# Patient Record
Sex: FEMALE | Race: WHITE
[De-identification: names, ages, dates, MRNs, and addresses within clinical notes are randomized per-mention and may not be internally consistent; named-entity substitution may affect disease eponyms.]

---

## 2020-05-22 ENCOUNTER — HOSPITAL ENCOUNTER (EMERGENCY)
Dept: HOSPITAL 8 - ED | Age: 35
Discharge: HOME | End: 2020-05-22
Payer: COMMERCIAL

## 2020-05-22 VITALS — HEIGHT: 64 IN | WEIGHT: 222.23 LBS | BODY MASS INDEX: 37.94 KG/M2

## 2020-05-22 VITALS — SYSTOLIC BLOOD PRESSURE: 120 MMHG | DIASTOLIC BLOOD PRESSURE: 67 MMHG

## 2020-05-22 DIAGNOSIS — K52.9: Primary | ICD-10-CM

## 2020-05-22 DIAGNOSIS — R10.31: ICD-10-CM

## 2020-05-22 DIAGNOSIS — R10.32: ICD-10-CM

## 2020-05-22 DIAGNOSIS — R11.2: ICD-10-CM

## 2020-05-22 LAB
ALBUMIN SERPL-MCNC: 3.9 G/DL (ref 3.4–5)
ALP SERPL-CCNC: 94 U/L (ref 45–117)
ALT SERPL-CCNC: 92 U/L (ref 12–78)
ANION GAP SERPL CALC-SCNC: 8 MMOL/L (ref 5–15)
BASOPHILS # BLD AUTO: 0.04 X10^3/UL (ref 0–0.1)
BASOPHILS NFR BLD AUTO: 0 % (ref 0–1)
BILIRUB SERPL-MCNC: 0.6 MG/DL (ref 0.2–1)
CALCIUM SERPL-MCNC: 9.5 MG/DL (ref 8.5–10.1)
CHLORIDE SERPL-SCNC: 108 MMOL/L (ref 98–107)
CREAT SERPL-MCNC: 0.93 MG/DL (ref 0.55–1.02)
EOSINOPHIL # BLD AUTO: 0.1 X10^3/UL (ref 0–0.4)
EOSINOPHIL NFR BLD AUTO: 1 % (ref 1–7)
ERYTHROCYTE [DISTWIDTH] IN BLOOD BY AUTOMATED COUNT: 13 % (ref 9.6–15.2)
LYMPHOCYTES # BLD AUTO: 3 X10^3/UL (ref 1–3.4)
LYMPHOCYTES NFR BLD AUTO: 35 % (ref 22–44)
MCH RBC QN AUTO: 29.7 PG (ref 27–34.8)
MCHC RBC AUTO-ENTMCNC: 33.8 G/DL (ref 32.4–35.8)
MD: NO
MICROSCOPIC: (no result)
MONOCYTES # BLD AUTO: 0.55 X10^3/UL (ref 0.2–0.8)
MONOCYTES NFR BLD AUTO: 6 % (ref 2–9)
NEUTROPHILS # BLD AUTO: 4.86 X10^3/UL (ref 1.8–6.8)
NEUTROPHILS NFR BLD AUTO: 57 % (ref 42–75)
PLATELET # BLD AUTO: 210 X10^3/UL (ref 130–400)
PMV BLD AUTO: 9.6 FL (ref 7.4–10.4)
PROT SERPL-MCNC: 7.7 G/DL (ref 6.4–8.2)
RBC # BLD AUTO: 4.79 X10^6/UL (ref 3.82–5.3)

## 2020-05-22 PROCEDURE — 84703 CHORIONIC GONADOTROPIN ASSAY: CPT

## 2020-05-22 PROCEDURE — 81003 URINALYSIS AUTO W/O SCOPE: CPT

## 2020-05-22 PROCEDURE — 36415 COLL VENOUS BLD VENIPUNCTURE: CPT

## 2020-05-22 PROCEDURE — 96374 THER/PROPH/DIAG INJ IV PUSH: CPT

## 2020-05-22 PROCEDURE — 80053 COMPREHEN METABOLIC PANEL: CPT

## 2020-05-22 PROCEDURE — 74177 CT ABD & PELVIS W/CONTRAST: CPT

## 2020-05-22 PROCEDURE — 99285 EMERGENCY DEPT VISIT HI MDM: CPT

## 2020-05-22 PROCEDURE — 83690 ASSAY OF LIPASE: CPT

## 2020-05-22 PROCEDURE — 85025 COMPLETE CBC W/AUTO DIFF WBC: CPT

## 2020-05-22 NOTE — NUR
PT C/O ABD CRAMPING THAT STARTED AT ONE IN THE AM TWO NIGHTS AGO FOLLOWED BY 
VOMITING AND DIARRHEA TWICE.  VOMITING HAS CEASED BUT PT HAS C/O BRB FROM 
RECTUM SINCE.  PT DOES REPORT STRAINING WHEN HAVING DIARRHEA. CHART UP FOR MD.

## 2021-11-15 NOTE — NUR
[place] ambulatory encounter:  [facility] [address]  [address] [place] [address]  [phone]    Assessment/PLAN:    1. Cough        Orders Placed This Encounter   • XR Chest PA and Lateral   • amoxicillin-clavulanate (Augmentin) 875-125 MG per tablet   • benzonatate (Tessalon Perles) 100 MG capsule    see Smartchart activity for details.    At this point since symptoms been going on for nearly 3 weeks and I am hearing decreased breath sounds with rhonchi over the left lower lung zone posteriorly, I am going to treat with Augmentin for pneumonia.  Patient has met the penicillin challenge by allergist and she does not have an allergy to penicillin.  GI side effects with Augmentin discussed.  Contagiousness discussed.  Tessalon Perles for cough.  Follow-up if not improving.    Return if symptoms worsen or fail to improve.    Patient Instructions   · Fluids.  · Rest.  · Over the counter analgesics.  · Follow-up with PCP (primary care provider) in 3-5 days if not better or seek medical attention earlier if worsens.  · OTC (Over the counter) Robitussin DM.  · Contagiousness discussed.  · Chest X-ray today.  Will call with results.            Diagnosis and plan discussed with the patient.  The patient indicates understanding these issues and agrees with the plan.         SUBJECTIVE:  Chief Complaint   Patient presents with   • Cough     cough for 3 wks, runny nose     She is a 37 year old female who presented requesting evaluation for cough which is occasionally productive for the past 3 weeks.  Has noticed a runny nose recently.  Denies any changes in taste or smell.  Denies any fevers, chills, diarrhea, nausea, vomiting, chest pain or shortness of breath.  Did take a COVID test recently which was negative.  Is taking over-the-counter cough medicine.  Not pregnant or nursing.  Nonsmoker.    PAST HISTORIES:  I have  TASK RN: THIS RN PRESENT DURING ANOSCOPY PROCEDURE WITH PA. PT TOLERATED WITH 
NO COMPLICATIONS. reviewed the past medical history, medications, allergies and social history listed in the medical record as well as the nursing notes for this encounter.    MEDICATIONS:  Current Outpatient Medications   Medication Sig Dispense Refill   • clobetasol (TEMOVATE) 0.05 % ointment APPLY TO AFFECTED AREA AT NIGHT FOR 6-12 WEEKS. THEN AS NEEDED 1-3 TIMES PER WEEK 45 g 2   • Multiple Vitamin (MULTIVITAMIN ADULT PO)        No current facility-administered medications for this visit.       ALLERGIES:  Allergies as of 11/15/2021 - Reviewed 11/15/2021   Allergen Reaction Noted   • Bactrim [sulfamethoxazole w/trimethoprim (co-trimoxazole)] HIVES 09/20/2013   • Clindamycin HIVES 01/13/2014   • Donnatal HIVES 09/20/2013   • Erythromycin HIVES 09/20/2013   • Levaquin [levofloxacin hemihydrate] HIVES 09/20/2013   • Pepto-bismol [bismuth subsalicylate] HIVES 09/20/2013   • Propantheline HIVES 09/20/2013   • Tramadol HIVES 09/20/2013   • Vicodin [hydrocodone-acetaminophen] VOMITING 01/13/2014   • Vigamox [moxifloxacin hydrochloride] HIVES 09/20/2013   • Zithromax [azithromycin] VOMITING 01/13/1994       REVIEW OF SYSTEMS:  As noted above.    OBJECTIVE:    PHYSICAL EXAM:  Blood pressure 120/76, pulse 88, temperature 99.1 °F (37.3 °C), temperature source Tympanic, height 5' 8\" (1.727 m), weight 62.1 kg (137 lb), last menstrual period 06/24/2021.    CONSTITUTIONAL:  The patient is a well-developed, well-nourished female in no obvious distress.  There is no jaundice, anemia, cyanosis or respiratory distress.  HEENT:  TMs (Tympanic membranes) are clear bilaterally.  External ears without deformities.  Hearing is grossly normal.  Nose without deformities.  There is moist nasal mucosa.  Throat is without erythema.  Moist mucous membranes.  Lips without lesions.  NECK:  Without lymphadenopathy.  There is full range of motion.  There is no tenderness noted.  CHEST:  Without any deformities.  Movement of the left side equals that of the right,  which is within normal limits.  LUNGS:  Rhonchi over the lower lung zones posteriorly with decreased breath sounds at the left lower lung zone.  CARDIOVASCULAR:  Regular rate and rhythm with normal S1 plus S2.  There are no murmurs auscultated.  SKIN:  Warm, dry, intact without any obvious rashes.  NEUROLOGIC:  Alert and oriented x 3.  PSYCHIATRIC:  Speech and behavior appropriate.  Mood and affect are normal.     DIAGNOSTICS :  Chest x-ray pending